# Patient Record
Sex: FEMALE | Race: WHITE | NOT HISPANIC OR LATINO
[De-identification: names, ages, dates, MRNs, and addresses within clinical notes are randomized per-mention and may not be internally consistent; named-entity substitution may affect disease eponyms.]

---

## 2018-06-05 PROBLEM — Z00.00 ENCOUNTER FOR PREVENTIVE HEALTH EXAMINATION: Status: ACTIVE | Noted: 2018-06-05

## 2018-06-08 ENCOUNTER — TRANSCRIPTION ENCOUNTER (OUTPATIENT)
Age: 73
End: 2018-06-08

## 2018-06-08 ENCOUNTER — OUTPATIENT (OUTPATIENT)
Dept: OUTPATIENT SERVICES | Facility: HOSPITAL | Age: 73
LOS: 1 days | End: 2018-06-08
Payer: MEDICARE

## 2018-06-08 ENCOUNTER — APPOINTMENT (OUTPATIENT)
Dept: PHYSICAL MEDICINE AND REHAB | Facility: CLINIC | Age: 73
End: 2018-06-08
Payer: MEDICARE

## 2018-06-08 VITALS
WEIGHT: 170 LBS | HEART RATE: 73 BPM | BODY MASS INDEX: 23.8 KG/M2 | DIASTOLIC BLOOD PRESSURE: 77 MMHG | SYSTOLIC BLOOD PRESSURE: 118 MMHG | HEIGHT: 71 IN

## 2018-06-08 DIAGNOSIS — M22.2X9 PATELLOFEMORAL DISORDERS, UNSPECIFIED KNEE: ICD-10-CM

## 2018-06-08 DIAGNOSIS — Z51.89 ENCOUNTER FOR OTHER SPECIFIED AFTERCARE: ICD-10-CM

## 2018-06-08 DIAGNOSIS — Z78.9 OTHER SPECIFIED HEALTH STATUS: ICD-10-CM

## 2018-06-08 DIAGNOSIS — M76.32 ILIOTIBIAL BAND SYNDROME, LEFT LEG: ICD-10-CM

## 2018-06-08 DIAGNOSIS — M51.16 INTERVERTEBRAL DISC DISORDERS WITH RADICULOPATHY, LUMBAR REGION: ICD-10-CM

## 2018-06-08 DIAGNOSIS — Z87.39 PERSONAL HISTORY OF OTHER DISEASES OF THE MUSCULOSKELETAL SYSTEM AND CONNECTIVE TISSUE: ICD-10-CM

## 2018-06-08 DIAGNOSIS — Z80.9 FAMILY HISTORY OF MALIGNANT NEOPLASM, UNSPECIFIED: ICD-10-CM

## 2018-06-08 PROCEDURE — 99204 OFFICE O/P NEW MOD 45 MIN: CPT

## 2018-06-11 RX ORDER — TRAMADOL HYDROCHLORIDE 50 MG/1
50 TABLET, COATED ORAL
Qty: 30 | Refills: 0 | Status: ACTIVE | COMMUNITY
Start: 2018-02-23

## 2018-06-11 RX ORDER — LEVOTHYROXINE SODIUM 0.14 MG/1
137 TABLET ORAL
Qty: 90 | Refills: 0 | Status: ACTIVE | COMMUNITY
Start: 2018-02-12

## 2018-06-15 ENCOUNTER — RX RENEWAL (OUTPATIENT)
Age: 73
End: 2018-06-15

## 2018-06-15 ENCOUNTER — OUTPATIENT (OUTPATIENT)
Dept: OUTPATIENT SERVICES | Facility: HOSPITAL | Age: 73
LOS: 1 days | End: 2018-06-15

## 2018-06-15 ENCOUNTER — APPOINTMENT (OUTPATIENT)
Dept: MRI IMAGING | Facility: CLINIC | Age: 73
End: 2018-06-15

## 2018-06-20 ENCOUNTER — OUTPATIENT (OUTPATIENT)
Dept: OUTPATIENT SERVICES | Facility: HOSPITAL | Age: 73
LOS: 1 days | End: 2018-06-20
Payer: MEDICARE

## 2018-06-20 ENCOUNTER — APPOINTMENT (OUTPATIENT)
Dept: CT IMAGING | Facility: CLINIC | Age: 73
End: 2018-06-20
Payer: MEDICARE

## 2018-06-20 DIAGNOSIS — M54.16 RADICULOPATHY, LUMBAR REGION: ICD-10-CM

## 2018-06-20 DIAGNOSIS — M76.32 ILIOTIBIAL BAND SYNDROME, LEFT LEG: ICD-10-CM

## 2018-06-20 PROCEDURE — 72131 CT LUMBAR SPINE W/O DYE: CPT | Mod: 26

## 2018-06-20 PROCEDURE — 72131 CT LUMBAR SPINE W/O DYE: CPT

## 2018-07-06 ENCOUNTER — APPOINTMENT (OUTPATIENT)
Dept: PHYSICAL MEDICINE AND REHAB | Facility: CLINIC | Age: 73
End: 2018-07-06
Payer: MEDICARE

## 2018-07-06 VITALS
HEIGHT: 71 IN | DIASTOLIC BLOOD PRESSURE: 77 MMHG | HEART RATE: 69 BPM | WEIGHT: 170 LBS | SYSTOLIC BLOOD PRESSURE: 135 MMHG | BODY MASS INDEX: 23.8 KG/M2

## 2018-07-06 DIAGNOSIS — M54.16 RADICULOPATHY, LUMBAR REGION: ICD-10-CM

## 2018-07-06 DIAGNOSIS — M25.569 PAIN IN UNSPECIFIED KNEE: ICD-10-CM

## 2018-07-06 PROCEDURE — 99214 OFFICE O/P EST MOD 30 MIN: CPT | Mod: 25

## 2018-07-06 PROCEDURE — 20551 NJX 1 TENDON ORIGIN/INSJ: CPT | Mod: LT

## 2018-07-06 RX ORDER — DICLOFENAC SODIUM 75 MG/1
75 TABLET, DELAYED RELEASE ORAL
Qty: 60 | Refills: 1 | Status: ACTIVE | COMMUNITY
Start: 2018-06-08 | End: 1900-01-01

## 2018-08-10 PROCEDURE — G8979: CPT | Mod: CJ

## 2018-08-10 PROCEDURE — G8980: CPT

## 2018-08-10 PROCEDURE — 97110 THERAPEUTIC EXERCISES: CPT

## 2018-08-10 PROCEDURE — 97162 PT EVAL MOD COMPLEX 30 MIN: CPT | Mod: KX

## 2018-08-10 PROCEDURE — 97010 HOT OR COLD PACKS THERAPY: CPT

## 2018-08-10 PROCEDURE — 97140 MANUAL THERAPY 1/> REGIONS: CPT

## 2018-08-10 PROCEDURE — G8978: CPT | Mod: CK

## 2019-04-29 ENCOUNTER — TRANSCRIPTION ENCOUNTER (OUTPATIENT)
Age: 74
End: 2019-04-29

## 2019-08-01 ENCOUNTER — APPOINTMENT (OUTPATIENT)
Dept: PHYSICAL MEDICINE AND REHAB | Facility: CLINIC | Age: 74
End: 2019-08-01
Payer: MEDICARE

## 2019-08-01 VITALS
DIASTOLIC BLOOD PRESSURE: 84 MMHG | WEIGHT: 170 LBS | HEIGHT: 71 IN | SYSTOLIC BLOOD PRESSURE: 139 MMHG | BODY MASS INDEX: 23.8 KG/M2

## 2019-08-01 DIAGNOSIS — M75.52 BURSITIS OF LEFT SHOULDER: ICD-10-CM

## 2019-08-01 DIAGNOSIS — M76.32 ILIOTIBIAL BAND SYNDROME, LEFT LEG: ICD-10-CM

## 2019-08-01 PROCEDURE — 20550 NJX 1 TENDON SHEATH/LIGAMENT: CPT | Mod: LT

## 2019-08-01 PROCEDURE — 99214 OFFICE O/P EST MOD 30 MIN: CPT | Mod: 25

## 2019-08-03 PROBLEM — M76.32 IT BAND SYNDROME, LEFT: Status: ACTIVE | Noted: 2018-06-08

## 2019-08-05 NOTE — HISTORY OF PRESENT ILLNESS
[FreeTextEntry1] : 73 yo F presented for follow up of her left knee pain as well as left shoulder pain. Patient was here last on 7/2018 and received left IT band injection for IT band syndrome. As per patient, she had significant relief for almost 1 year until her pain started to come back again starting May 2019. Patient stated that her pain is located on the lateral side of her left thigh to the lateral side of her left knee. Patient endorses to pain being similar to before her injection. Pain is currently rated a 5/10 and denies any numbness or tingling sensation. Pain is described as a throbbing sensation which gets worse when walking especially when walking up or down the stairs. No new neurological changes, bowel or bladder incontinence.\par \par As for patient's shoulder pain, patient stated that she had undergone PT for her left shoulder 2 years ago since she was diagnosed with impingement syndrome. Patient endorsed to PT resolving her shoulder issues but stated that her shoulder pain returned a few weeks ago. Pain is described as an ache that increases when she raises her left arm. Pain is made better when at rest. Rates the shoulder pain as 4/10. Denies any shooting pain, numbness or tingling down her upper extremities.

## 2019-08-05 NOTE — PROCEDURE
[de-identified] : Reason for procedure: knee pain\par \par Procedure: \par 1. left IT band insertion injection\par 2. ultrasound guidance\par \par Physician: Dr. Jones\par Medication injected: 40mg Kenalog, 2cc Lidocaine 1%\par Sedation medications: None\par Estimated blood loss: None\par Complications: None\par \par Technique: The procedure was explained in detail including associated risk and informed consent was obtained. The patient was placed in supine position. Ultrasound evaluation demonstrated the IT band overlying the lateral knee joint and insertion into gerdy tubercle. The area was prepped in normal sterile fashion with Chloroprep. A 25 gauge 1.5 inch needle was advanced toward the mixed hypo/hyperechoic line pver the IT band with an inline approach. After negative aspiration of heme, the above medications were injected into the bursa with ultrasound guidance. Needle was then removed, bandaid placed over injection site. There was no complications, the patient was provided with post injection instructions and noted improvement in pain and ROM after injection.

## 2019-08-05 NOTE — PHYSICAL EXAM
[FreeTextEntry1] : General: Awake and alert in no acute distress\par Psych: normal mood and affect\par HEENT: NC/AT, normal visual tracking\par Pulmonary: no resp distress, chest expansion appears symmetrical\par CV: extremities are warm and perfused\par Abd: non-distended\par Ext: no c/c/e\par Skin: normal color, appearance, and temperature\par \par Cervical Spine/Shoulder:\par Inspection: normal muscle bulk without asymmetry\par Tenderness to palpation: no TTP bilateral thoracic paraspinals, medical scapular border, levator scapulae, upper trapezius, rhomboids, spinous process, AC joint, subacromial, biceps groove\par ROM: within functional limits, pain elicited on the left upper extremity when arm abducted >90 \par MMT: 5/5 bilateral upper extremities\par Reflexes: symmetric bilateral biceps, triceps\par Sensory: intact to light touch in all dermatomes of the bilateral upper extremities\par \par Provocative testing:\par Spurling - negative\par Distraction - negative\par Goodson - negative\par Lhermitte - negative\par Neer - Positive on the left \par Hawkin's - Positive on the left \par Drop arm - negative\par \par  \par Lumbar/Hip Spine:\par Gait - non-antalgic\par Inspection: normal muscle bulk without asymmetry\par Tenderness to palpation: TTP on the left lateral thigh, left lateral tibia \par ROM: within functional limits\par MMT: 5/5 bilateral lower extremities (HF, KE, KF, DF, PF, EHL)\par Reflexes: symmetric bilateral achilles and patella\par Sensory: intact to light touch in all dermatomes of the bilateral lower extremities\par \par Provocative testing:\par Sterling's - negative\par SLR - negative\par JUNO/FADIR - negative\par Mindi's test- negative

## 2019-08-05 NOTE — ASSESSMENT
[FreeTextEntry1] : Ms. BEHRMANN is a 73 year year old woman here for follow up of left knee pain likely secondary to left IT band syndrome and left knee OA. She has had relief with conservative care including PT, oral NSAIDs as she has slow but gradual improvements. Since last visit patient had IT band injection performed and endorsed to significant reduction in pain for about 1 year. Pain has gradually returned today. Repeat IT band injection was performed today without any complications. As for the left shoulder pain, based on clinical evaluation and review of available images, pain is likely secondary to impingement syndrome. Patient has had good results with physical therapy before. Patient was recommended to start PT again for her shoulder as well as her left knee pain. Follow up in about 2 months. \par

## 2023-07-07 ENCOUNTER — NON-APPOINTMENT (OUTPATIENT)
Age: 78
End: 2023-07-07